# Patient Record
Sex: FEMALE | Race: BLACK OR AFRICAN AMERICAN | NOT HISPANIC OR LATINO | URBAN - METROPOLITAN AREA
[De-identification: names, ages, dates, MRNs, and addresses within clinical notes are randomized per-mention and may not be internally consistent; named-entity substitution may affect disease eponyms.]

---
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
## 2022-02-11 ENCOUNTER — TELEPHONE (OUTPATIENT)
Dept: DERMATOLOGY | Facility: CLINIC | Age: 22
End: 2022-02-11
Payer: COMMERCIAL

## 2022-02-25 ENCOUNTER — OFFICE VISIT (OUTPATIENT)
Dept: DERMATOLOGY | Facility: CLINIC | Age: 22
End: 2022-02-25
Payer: COMMERCIAL

## 2022-02-25 DIAGNOSIS — L73.2 HIDRADENITIS SUPPURATIVA: Primary | ICD-10-CM

## 2022-02-25 DIAGNOSIS — Z30.09 ENCOUNTER FOR OTHER GENERAL COUNSELING OR ADVICE ON CONTRACEPTION: ICD-10-CM

## 2022-02-25 DIAGNOSIS — L91.0 KELOID: ICD-10-CM

## 2022-02-25 PROCEDURE — 99999 PR PBB SHADOW E&M-EST. PATIENT-LVL III: ICD-10-PCS | Mod: PBBFAC,,, | Performed by: DERMATOLOGY

## 2022-02-25 PROCEDURE — 99214 PR OFFICE/OUTPT VISIT, EST, LEVL IV, 30-39 MIN: ICD-10-PCS | Mod: 25,S$GLB,, | Performed by: DERMATOLOGY

## 2022-02-25 PROCEDURE — 99214 OFFICE O/P EST MOD 30 MIN: CPT | Mod: 25,S$GLB,, | Performed by: DERMATOLOGY

## 2022-02-25 PROCEDURE — 99999 PR PBB SHADOW E&M-EST. PATIENT-LVL III: CPT | Mod: PBBFAC,,, | Performed by: DERMATOLOGY

## 2022-02-25 PROCEDURE — 11900 INJECT SKIN LESIONS </W 7: CPT | Mod: S$GLB,,, | Performed by: DERMATOLOGY

## 2022-02-25 PROCEDURE — 11900 PR INJECTION INTO SKIN LESIONS, UP TO 7: ICD-10-PCS | Mod: S$GLB,,, | Performed by: DERMATOLOGY

## 2022-02-25 RX ORDER — CLINDAMYCIN PHOSPHATE 11.9 MG/ML
SOLUTION TOPICAL 2 TIMES DAILY
Qty: 60 ML | Refills: 5 | Status: SHIPPED | OUTPATIENT
Start: 2022-02-25

## 2022-02-25 RX ORDER — SPIRONOLACTONE 50 MG/1
TABLET, FILM COATED ORAL
Qty: 60 TABLET | Refills: 3 | Status: SHIPPED | OUTPATIENT
Start: 2022-02-25

## 2022-02-25 NOTE — PROGRESS NOTES
Subjective:       Patient ID:  Beto Bell is a 22 y.o. female who presents for   Chief Complaint   Patient presents with    Keloid     F/u-better     HPI  Established patient.  F/u ILK for keloid at superior umbilicus following piercing in summer 2020. S/p ILK (10 mg/ml in 4/2021 and 20 mg/ml in 5/2021; 40 mg/ml most recently in 11/2022). Scar much improved especially superiorly, some surrounding hypopigmentation at this location. Persistent but improved induration inferiorly.   Hx of HS at axillae, submammary, groin/buttocks; ongoing x years (since puberty). Currently treating with BPO wash daily to all affected areas; past use of topical clindamycin and PO minocycline, doxycycline. Has Nexplanon (inserted ~2 years ago, denies any significant worsening of HS). Areas of activity currently under breast, periumbilical, groin.      Review of Systems   Constitutional: Negative for malaise.        Objective:    Physical Exam   Constitutional: She appears well-developed and well-nourished. No distress.   Neurological: She is alert and oriented to person, place, and time. She is not disoriented.   Psychiatric: She has a normal mood and affect.   Skin:   Areas Examined (abnormalities noted in diagram):   Head / Face Inspection Performed  Neck Inspection Performed  Chest / Axilla Inspection Performed  Abdomen Inspection Performed  Genitals / Buttocks / Groin Inspection Performed  Back Inspection Performed  RUE Inspected  LUE Inspection Performed              Diagram Legend     Erythematous scaling macule/papule c/w actinic keratosis       Vascular papule c/w angioma      Pigmented verrucoid papule/plaque c/w seborrheic keratosis      Yellow umbilicated papule c/w sebaceous hyperplasia      Irregularly shaped tan macule c/w lentigo     1-2 mm smooth white papules consistent with Milia      Movable subcutaneous cyst with punctum c/w epidermal inclusion cyst      Subcutaneous movable cyst c/w pilar cyst      Firm pink to  brown papule c/w dermatofibroma      Pedunculated fleshy papule(s) c/w skin tag(s)      Evenly pigmented macule c/w junctional nevus     Mildly variegated pigmented, slightly irregular-bordered macule c/w mildly atypical nevus      Flesh colored to evenly pigmented papule c/w intradermal nevus       Pink pearly papule/plaque c/w basal cell carcinoma      Erythematous hyperkeratotic cursted plaque c/w SCC      Surgical scar with no sign of skin cancer recurrence      Open and closed comedones      Inflammatory papules and pustules      Verrucoid papule consistent consistent with wart     Erythematous eczematous patches and plaques     Dystrophic onycholytic nail with subungual debris c/w onychomycosis     Umbilicated papule    Erythematous-base heme-crusted tan verrucoid plaque consistent with inflamed seborrheic keratosis     Erythematous Silvery Scaling Plaque c/w Psoriasis     See annotation      Assessment / Plan:        Hidradenitis suppurativa  -     clindamycin (CLEOCIN T) 1 % external solution; Apply topically 2 (two) times daily.  Dispense: 60 mL; Refill: 5  -     spironolactone (ALDACTONE) 50 MG tablet; Start with 1 po qday, increase to 2 po qday as tolerated  Dispense: 60 tablet; Refill: 3  -     Ambulatory referral/consult to Obstetrics / Gynecology; Future; Expected date: 03/04/2022  Encounter for other general counseling or advice on contraception  -     Ambulatory referral/consult to Obstetrics / Gynecology; Future; Expected date: 03/04/2022    - Discussed diagnosis, etiology, and treatment options.   - Treatment recs as below.   - Counseled on potential SE of medication(s) and instructed on use.   Discussed benefits and risks of therapy including but not limited to breakthrough bleeding, breast tenderness, and elevated potassium levels which may give symptoms of fatigue, palpitations, and nausea. Patient should limit potassium intake - avoid potassium supplements or salt substitutes, limit bananas and  citrus fruits. Pregnancy must be avoided while taking spironolactone.   TREATMENT REGIMEN:   ENVIRONMENTAL:   Dilute bleach baths or dilute bleach spray (Levicyn) or Vetrycin ([employer])  o Recipe for dilute bleach baths 2 times per week (or more often as needed) and discussed protocol -- add 1/2 cup of regular strength (6%) bleach to a full tub of lukewarm water and soak for 10 - 15 minutes. (use 1/4 cup for a half-full tub of water).   Hibiclens wash and /or (can alternate) benzoyl peroxide wash to affected areas daily   Avoid smoking   Weight loss: Low glycemic index diet/no sugar/no dairy OR paleo diet OR Keto diet (look up sugar busters or zone diet)  o 50 - 75% of HS pts are obese  o wt loss can decrease risk for disease progression  o obesity leads to increased friction which exacerbates disease  o Keep food journal   Wear loose fitting clothing - friction exacerbates disease   Stop shaving where you have breakouts - friction exacerbates disease; consider laser hair removal   Keep skin cool - overheating and sweating can flare HS    Warm/hot compress for home use on a painful deep lump  SUPPLEMENTS (anti-inflammatory):   Turmeric - start with 500mg every day and increase to 1 g every day (may cause GI upset)- 100x more anti-inflammatory if mixed with black pepper or with fatty food  TOPICALS/IL:   ILK as needed for boils (can call to schedule injections as needed)   Clindamycin solution (roll on or can put in spray bottle) - Use on affected areas 2x/day  HORMONAL:   Spironolactone - start today    Keloid  - Discussed diagnosis, etiology, and treatment options.  - Intralesional Kenalog Injection Procedure Note: Discussed procedure with patient/patient's guardian including risks and benefits as well as treatment alternatives. Risks of procedure include pain, bleeding, surrounding hypopigmentation, atrophy, infection, partial response, lack of response, recurrence. Verbal consent obtained. Area to be  treated cleansed with alcohol. A total of 0.5 cc of Kenalog 20 mg/ml used to treat 1 lesion(s) - 2 units. Hemostasis achieved with pressure. Patient tolerated procedure well. After-visit wound care instructions. F/u 1 month PRN. [NDC for Kenalog 10 mg/cc: 7328-3823-84]    - Referral to plastic surgery; patient to c/w monthly ILK with dermatology until care transitioned.          Follow up in about 1 month (around 3/25/2022) for keloid ilk, hs f/u.

## 2022-02-25 NOTE — Clinical Note
[email or web address] [facility] tretinoin 0.25% mixed with azelaic acid gel  - Start tretinoin 0.025% cream qhs; eRX sent. Discussed SE of topical retinoids and instructed on use.  Refills 5 No

## 2022-02-25 NOTE — PATIENT INSTRUCTIONS
Resources:  For patients: Hidradenitis suppurativa foundation    TREATMENT REGIMEN:   ENVIRONMENTAL:  Dilute bleach baths or dilute bleach spray (Levicyn) or Vetrycin ([employer])  Recipe for dilute bleach baths 2 times per week (or more often as needed) and discussed protocol -- add 1/2 cup of regular strength (6%) bleach to a full tub of lukewarm water and soak for 10 - 15 minutes. (use 1/4 cup for a half-full tub of water).  Hibiclens wash and /or (can alternate) benzoyl peroxide wash to affected areas daily  Avoid smoking  Weight loss: Low glycemic index diet/no sugar/no dairy OR paleo diet OR Keto diet (look up sugar busters or zone diet)  50 - 75% of HS pts are obese  wt loss can decrease risk for disease progression  obesity leads to increased friction which exacerbates disease  Keep food journal  Wear loose fitting clothing - friction exacerbates disease  Stop shaving where you have breakouts - friction exacerbates disease; consider laser hair removal  Keep skin cool - overheating and sweating can flare HS; consider Robinul  Warm/hot compress for home use on a painful deep lump    SUPPLEMENTS (anti-inflammatory):  Turmeric - start with 500mg every day and increase to 1 g every day (may cause GI upset)- 100x more anti-inflammatory if mixed with black pepper or with fatty food    TOPICALS/IL:  ILK as needed for boils (can call to schedule injections as needed)  Clindamycin solution (roll on or can put in spray bottle) - Use on affected areas 2x/day    HORMONAL:  Spironolactone - start today

## 2022-02-25 NOTE — Clinical Note
Can you assist with plastic surgery scheduling? Ordered referral at  - OK if not in near future  Thank you!

## 2022-03-21 ENCOUNTER — OFFICE VISIT (OUTPATIENT)
Dept: DERMATOLOGY | Facility: CLINIC | Age: 22
End: 2022-03-21
Payer: COMMERCIAL

## 2022-03-21 DIAGNOSIS — L73.2 HIDRADENITIS SUPPURATIVA: Primary | ICD-10-CM

## 2022-03-21 DIAGNOSIS — L91.0 KELOID: ICD-10-CM

## 2022-03-21 PROCEDURE — 11900 INJECT SKIN LESIONS </W 7: CPT | Mod: S$GLB,,, | Performed by: DERMATOLOGY

## 2022-03-21 PROCEDURE — 99999 PR PBB SHADOW E&M-EST. PATIENT-LVL II: ICD-10-PCS | Mod: PBBFAC,,, | Performed by: DERMATOLOGY

## 2022-03-21 PROCEDURE — 11900 PR INJECTION INTO SKIN LESIONS, UP TO 7: ICD-10-PCS | Mod: S$GLB,,, | Performed by: DERMATOLOGY

## 2022-03-21 PROCEDURE — 99999 PR PBB SHADOW E&M-EST. PATIENT-LVL II: CPT | Mod: PBBFAC,,, | Performed by: DERMATOLOGY

## 2022-03-21 PROCEDURE — 99499 UNLISTED E&M SERVICE: CPT | Mod: S$GLB,,, | Performed by: DERMATOLOGY

## 2022-03-21 PROCEDURE — 99499 NO LOS: ICD-10-PCS | Mod: S$GLB,,, | Performed by: DERMATOLOGY

## 2022-03-21 NOTE — PROGRESS NOTES
Subjective:       Patient ID:  Beto Bell is a 22 y.o. female who presents for   Chief Complaint   Patient presents with    Keloid     F/u     HPI  Established patient.  1 month f/u ILK for keloid at superior umbilicus following piercing in summer 2020. S/p ILK (10 mg/ml in 4/2021 and 20 mg/ml in 5/2021; 40 mg/ml more recently in 11/2022, 20 mg/ml to inferior portion last month). Scar overall much much softer/more flaccid. Residual induration at inferior aspect, some surrounding hypopigmentation superiorly. Still awaiting plastic surgery scheduling for revision.   Hx of HS at axillae, submammary, groin/buttocks; ongoing x years (since puberty). Currently treating with BPO wash daily to all affected areas, alternating with Hibiclens. Prescribed spironolactone at ; started medication one week ago. No significant change. Mild activity today; no painful boils.   Has Nexplanon (inserted ~2 years ago, denies any significant worsening of HS).     Review of Systems   Constitutional: Negative for malaise.        Objective:    Physical Exam   Constitutional: She appears well-developed and well-nourished. No distress.   Neurological: She is alert and oriented to person, place, and time. She is not disoriented.   Psychiatric: She has a normal mood and affect.   Skin:   Areas Examined (abnormalities noted in diagram):   Head / Face Inspection Performed  Neck Inspection Performed  Chest / Axilla Inspection Performed  Abdomen Inspection Performed  Genitals / Buttocks / Groin Inspection Performed  Back Inspection Performed  RUE Inspected  LUE Inspection Performed              Diagram Legend     Erythematous scaling macule/papule c/w actinic keratosis       Vascular papule c/w angioma      Pigmented verrucoid papule/plaque c/w seborrheic keratosis      Yellow umbilicated papule c/w sebaceous hyperplasia      Irregularly shaped tan macule c/w lentigo     1-2 mm smooth white papules consistent with Milia      Movable  subcutaneous cyst with punctum c/w epidermal inclusion cyst      Subcutaneous movable cyst c/w pilar cyst      Firm pink to brown papule c/w dermatofibroma      Pedunculated fleshy papule(s) c/w skin tag(s)      Evenly pigmented macule c/w junctional nevus     Mildly variegated pigmented, slightly irregular-bordered macule c/w mildly atypical nevus      Flesh colored to evenly pigmented papule c/w intradermal nevus       Pink pearly papule/plaque c/w basal cell carcinoma      Erythematous hyperkeratotic cursted plaque c/w SCC      Surgical scar with no sign of skin cancer recurrence      Open and closed comedones      Inflammatory papules and pustules      Verrucoid papule consistent consistent with wart     Erythematous eczematous patches and plaques     Dystrophic onycholytic nail with subungual debris c/w onychomycosis     Umbilicated papule    Erythematous-base heme-crusted tan verrucoid plaque consistent with inflamed seborrheic keratosis     Erythematous Silvery Scaling Plaque c/w Psoriasis     See annotation      Assessment / Plan:        Hidradenitis suppurativa  - Discussed diagnosis, etiology, and treatment options.   - Treatment recs as below.   - Counseled on potential SE of medication(s) and instructed on use.   Discussed benefits and risks of therapy including but not limited to breakthrough bleeding, breast tenderness, and elevated potassium levels which may give symptoms of fatigue, palpitations, and nausea. Patient should limit potassium intake - avoid potassium supplements or salt substitutes, limit bananas and citrus fruits. Pregnancy must be avoided while taking spironolactone.   TREATMENT REGIMEN:   ENVIRONMENTAL:   Hibiclens wash alternating with benzoyl peroxide wash to affected areas daily   Avoid smoking   Weight loss: Low glycemic index diet/no sugar/no dairy OR paleo diet OR Keto diet (look up sugar busters or zone diet)  o 50 - 75% of HS pts are obese  o wt loss can decrease risk for  disease progression  o obesity leads to increased friction which exacerbates disease  o Keep food journal   Wear loose fitting clothing - friction exacerbates disease   Stop shaving where you have breakouts - friction exacerbates disease; consider laser hair removal   Keep skin cool - overheating and sweating can flare HS    Warm/hot compress for home use on a painful deep lump  SUPPLEMENTS (anti-inflammatory):   Turmeric - start with 500mg every day and increase to 1 g every day (may cause GI upset)- 100x more anti-inflammatory if mixed with black pepper or with fatty food  TOPICALS/IL:   ILK as needed for boils (can call to schedule injections as needed)   Clindamycin solution (roll on or can put in spray bottle) - Use on affected areas 2x/day  HORMONAL:   Continue with spironolactone  mg daily.     Keloid  - Discussed diagnosis, etiology, and treatment options.  - Intralesional Kenalog Injection Procedure Note: Discussed procedure with patient/patient's guardian including risks and benefits as well as treatment alternatives. Risks of procedure include pain, bleeding, surrounding hypopigmentation, atrophy, infection, partial response, lack of response, recurrence. Verbal consent obtained. Area to be treated cleansed with alcohol. A total of 0.1 cc of Kenalog 20 mg/ml used to treat 1 lesion(s) - 2 units. Hemostasis achieved with pressure. Patient tolerated procedure well. After-visit wound care instructions. F/u 1 month PRN. [NDC for Kenalog 10 mg/cc: 4942-0661-19]    - Pending scheduling with plastic surgery; patient to c/w monthly ILK PRN induration until care transitioned. Discussed with Ty; plastic surgery scheduling on hold currently for cosmetic services given short staffing.          Follow up in about 1 month (around 4/21/2022).